# Patient Record
(demographics unavailable — no encounter records)

---

## 2025-03-03 NOTE — PAST MEDICAL HISTORY
[Menarche Age ____] : age at menarche was [unfilled] [Menopause Age____] : age at menopause was [unfilled] [Total Preg ___] : G[unfilled] [Live Births ___] : P[unfilled]  [Age At Live Birth ___] : Age at live birth: [unfilled] [FreeTextEntry8] : 4 yr

## 2025-03-03 NOTE — ASSESSMENT
[FreeTextEntry1] : Ms Cochran is a 71 year old woman at high risk for breast cancer. Her breast exam today is without suspicious findings. A breast MRI is ordered for April. I would like to see her back for a follow-up in 1 year. She understands and is comfortable with the plan. She is encouraged to call if any questions or concerns arise.

## 2025-03-03 NOTE — DATA REVIEWED
[FreeTextEntry1] : I have independently reviewed the reports and the images.   Breast MRI 4/29/24 - BIRADS 2  B/l mammogram and complete US 10/7/24 - heterogenously dense  - BIRADS 2

## 2025-03-03 NOTE — PHYSICAL EXAM
[Normocephalic] : normocephalic [Atraumatic] : atraumatic [Sclera nonicteric] : sclera nonicteric [Supple] : supple [No Supraclavicular Adenopathy] : no supraclavicular adenopathy [No Cervical Adenopathy] : no cervical adenopathy [Clear to Auscultation Bilat] : clear to auscultation bilaterally [Normal Sinus Rhythm] : normal sinus rhythm [Examined in the supine and seated position] : examined in the supine and seated position [Bra Size: ___] : Bra Size: [unfilled] [No dominant masses] : no dominant masses in right breast  [No dominant masses] : no dominant masses left breast [No Nipple Retraction] : no left nipple retraction [No Nipple Discharge] : no left nipple discharge [No Axillary Lymphadenopathy] : no left axillary lymphadenopathy [No Edema] : no edema [No Rashes] : no rashes [No Ulceration] : no ulceration [de-identified] : Inferior transverse scar

## 2025-03-03 NOTE — HISTORY OF PRESENT ILLNESS
[FreeTextEntry1] : Ms. Cochran is a 71 year old woman who presents for a follow up for a high risk for breast cancer. Her breast history is notable for a benign left breast excision and needle biopsy. She has no breast symptoms. 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  Her genetic panel testing is negative. Her family history is significant for breast cancer in her mother at 75, sister at 50 who is BRCA negative, maternal aunt at 75, and paternal grandmother at 45.

## 2025-03-03 NOTE — CONSULT LETTER
[Dear  ___] : Dear ~SALVADOR, [Courtesy Letter:] : I had the pleasure of seeing your patient, [unfilled], in my office today. [Please see my note below.] : Please see my note below. [Consult Closing:] : Thank you very much for allowing me to participate in the care of this patient.  If you have any questions, please do not hesitate to contact me. [Sincerely,] : Sincerely, [FreeTextEntry3] : Kena Gambel MD FACS

## 2025-04-30 NOTE — HISTORY OF PRESENT ILLNESS
[Sudden] : sudden [3] : 3 [1] : 2 [Dull/Aching] : dull/aching [Constant] : constant [Household chores] : household chores [Leisure] : leisure [Social interactions] : social interactions [Rest] : rest [Meds] : meds [Walking] : walking [Retired] : Work status: retired [de-identified] : New patient here today complaining of bilateral ankle pain, L>R. No specific injury, pain has been present for 6 weeks. Hx of spraining her left ankle 3 years ago. Has had PT for her left ankle back in 2022.  No recent treatment. States she is having difficulty with walking, reports her left ankle gives out. Taking Advil PRN. Experiencing aching pain medial of her right ankle. 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

## 2025-04-30 NOTE — ASSESSMENT
[FreeTextEntry1] : 71yoF with left ankle instability, bilateral posterior tibial tendinitis left with mild planovalgus deformity and right without deformity. Patient with + anterior drawer of left ankle and notes instability that is iterfering with her ADLs/QOL. -Rx MRI left ankle w/o contrast to r/o tendinous/ligamentous injury and plan for possible surgical intervention -Activities as tolerated -Rest, ice, compression, elevation, NSAIDs PRN for pain. -All questions answered -F/u after MRI   The diagnosis was explained in detail. The potential non-surgical and surgical treatments were reviewed. The relative risks and benefits of each option were considered relative to the patients age, activity level, medical history, symptom severity and previously attempted treatments.   The patient was advised to consult with their primary medical provider prior to initiation of any new medications to reduce the risk of adverse effects specific to their long-term home medications and medical history.   The patient's current medications management of their orthopedic diagnosis was reviewed today:   1) We discussed a comprehensive treatment plan that included possible pharmaceutical management involving the use of prescription strength medications including but not limited to options as oral Naprosyn 500mg BID, once daily Meloxicam 15 mg, or 500-650 mg Tylenol versus over the counter oral medications and topical prescriptions NSAID Pennsaid vs over the counter Voltaren gel.  2) There is a moderate risk of morbidity with further treatment, especially from use of prescription strength medications and possible side effects of these medications which include upset stomach with oral medications, skin reactions to topical medications and cardiac/renal issues with long term use.  3) I recommended that hte patient follow-up with their medical physician to discuss any significant specific potential issues with long term medication use such as interactions with current medications or with exacerbation of underlying medical comorbidities.  4) The benefits and risks associated with use of injectable, oral or topical, prescription and over the counter anti-inflammatory medications were discussed with the patient. The patient voiced understanding of the risks including but not limited to bleeding, stroke, kidney dysfunction, heart disease, and were referred to the black box warning label for further information  Entered by Gurdeep Andrew PA-C acting as scribe. Dr. Almaraz Attestation The documentation recorded by the scribe, in my presence, accurately reflects the service I, Dr. Almaraz, personally performed, and the decisions made by me with my edits as appropriate.

## 2025-04-30 NOTE — PHYSICAL EXAM
[de-identified] : Examination of the left foot and ankle is as follows: INSPECTION: mild medial ankle swelling, mild planovalgus deformity, no ecchymosis, no abrasion, laceration, no erythema PALPATION: lateral ligament tenderness, posterior tibial tendon tenderness ROM: plantarflexion 40 degrees, inversion 30 degrees, eversion 20 degrees, dorsiflexion 20 degrees STRENGTH: dorsiflexion 5/5, plantar flexion 5/5, inversion 5/5, eversion 5/5, EHL 5/5, FHL 5/5 TESTING: + anterior drawer VASCULAR: dorsalis pedis pulse: 2+, posterior tibialis pulse: 2+ NEURO: Sensation present to light touch in all distributions GAIT: mildly antalgic, ambulation without assisted devices   X-rays of the left ankle is as follows: Ankle 3 view AP/Lateral/Oblique: No fractures, subluxations or dislocations. No major abnormalities.  Examination of the right foot and ankle is as follows: INSPECTION: no swelling, no ecchymosis, no abrasion, laceration, no erythema, no deformity PALPATION: mild posterior tibial tendon tenderness, no lateral ligament tenderness ROM: plantarflexion 40 degrees, inversion 30 degrees, eversion 20 degrees, dorsiflexion 20 degrees STRENGTH: dorsiflexion 5/5, plantar flexion 5/5, inversion 5/5, eversion 5/5, EHL 5/5, FHL 5/5 VASCULAR: dorsalis pedis pulse: 2+, posterior tibialis pulse: 2+ NEURO: Sensation present to light touch in all distributions GAIT: mildly antalgic, ambulation without assisted devices   X-rays of the right ankle is as follows: Ankle 3 view AP/Lateral/Oblique: No fractures, subluxations or dislocations. No major abnormalities.

## 2025-05-14 NOTE — PHYSICAL EXAM
[de-identified] : Examination of the left foot and ankle is as follows: INSPECTION: mild medial ankle swelling, mild planovalgus deformity, no ecchymosis, no abrasion, laceration, no erythema PALPATION: lateral ligament tenderness, posterior tibial tendon tenderness ROM: plantarflexion 40 degrees, inversion 30 degrees, eversion 20 degrees, dorsiflexion 20 degrees STRENGTH: dorsiflexion 5/5, plantar flexion 5/5, inversion 5/5, eversion 5/5, EHL 5/5, FHL 5/5 TESTING: + anterior drawer VASCULAR: dorsalis pedis pulse: 2+, posterior tibialis pulse: 2+ NEURO: Sensation present to light touch in all distributions GAIT: mildly antalgic, ambulation without assisted devices   X-rays of the left ankle is as follows: Ankle 3 view AP/Lateral/Oblique: No fractures, subluxations or dislocations. No major abnormalities.  Examination of the right foot and ankle is as follows: INSPECTION: no swelling, no ecchymosis, no abrasion, laceration, no erythema, no deformity PALPATION: mild posterior tibial tendon tenderness, no lateral ligament tenderness ROM: plantarflexion 40 degrees, inversion 30 degrees, eversion 20 degrees, dorsiflexion 20 degrees STRENGTH: dorsiflexion 5/5, plantar flexion 5/5, inversion 5/5, eversion 5/5, EHL 5/5, FHL 5/5 VASCULAR: dorsalis pedis pulse: 2+, posterior tibialis pulse: 2+ NEURO: Sensation present to light touch in all distributions GAIT: mildly antalgic, ambulation without assisted devices   X-rays of the right ankle is as follows: Ankle 3 view AP/Lateral/Oblique: No fractures, subluxations or dislocations. No major abnormalities.

## 2025-05-14 NOTE — HISTORY OF PRESENT ILLNESS
[Sudden] : sudden [3] : 3 [1] : 2 [Dull/Aching] : dull/aching [Constant] : constant [Household chores] : household chores [Leisure] : leisure [Social interactions] : social interactions [Rest] : rest [Meds] : meds [Walking] : walking [Retired] : Work status: retired [de-identified] : New patient here for left ankle MRI viewing. Patient had MRI at [facility] on 5/1/25.  IMPRESSION   Mild tenosynovitis of the posterior tibial and Peroneal tendons without tendinopathy or tear Mild distal Achilles tendinosis without tear Mild to moderate diffuse partial thickness chondral loss in the tibiotalar joint with areas of deep chondral fissuring and moderate reactive subchondral marrow edema in the distal tibial plafond No evidence of ligament tear Small ankle and talonavicular joint effusions with mild posteromedial soft tissue edema Mild plantar fascia thickening without tear   [] : Post Surgical Visit: no [FreeTextEntry1] : bilateral ankles L>R [FreeTextEntry3] : 6 weeks ago  [FreeTextEntry5] : NKI  [FreeTextEntry6] : weakness  [de-identified] : 2022 [de-identified] : Kasandra  [de-identified] : 2022 for left ankle  [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

## 2025-05-14 NOTE — ASSESSMENT
[FreeTextEntry1] : 71yoF presenting today for MRI viewing of her left ankle revealing PTTD, peroneal tendintis, achilles tendinosis, mild to moderate tibiotalar, ST, TN, CC joint djd, stress reaction within tibial plafond. Recommend non-surgical management. Patient with + anterior drawer of left ankle and notes instability that is interfering with her ADLs/QOL. -Rx PT given today -Discussed with patient that if she continues to have persistent pain that she will be indicated for left ankle lateral ligament reconstruction, possible tibial plafond subchondroplasty -Activities as tolerated -Rest, ice, compression, elevation, NSAIDs PRN for pain. -All questions answered -F/u 6 weeks   The diagnosis was explained in detail. The potential non-surgical and surgical treatments were reviewed. The relative risks and benefits of each option were considered relative to the patients age, activity level, medical history, symptom severity and previously attempted treatments.   The patient was advised to consult with their primary medical provider prior to initiation of any new medications to reduce the risk of adverse effects specific to their long-term home medications and medical history.   The patient's current medications management of their orthopedic diagnosis was reviewed today:   1) We discussed a comprehensive treatment plan that included possible pharmaceutical management involving the use of prescription strength medications including but not limited to options as oral Naprosyn 500mg BID, once daily Meloxicam 15 mg, or 500-650 mg Tylenol versus over the counter oral medications and topical prescriptions NSAID Pennsaid vs over the counter Voltaren gel.  2) There is a moderate risk of morbidity with further treatment, especially from use of prescription strength medications and possible side effects of these medications which include upset stomach with oral medications, skin reactions to topical medications and cardiac/renal issues with long term use.  3) I recommended that hte patient follow-up with their medical physician to discuss any significant specific potential issues with long term medication use such as interactions with current medications or with exacerbation of underlying medical comorbidities.  4) The benefits and risks associated with use of injectable, oral or topical, prescription and over the counter anti-inflammatory medications were discussed with the patient. The patient voiced understanding of the risks including but not limited to bleeding, stroke, kidney dysfunction, heart disease, and were referred to the black box warning label for further information  Entered by Gurdeep Andrew PA-C acting as scribe. Dr. Almaraz Attestation The documentation recorded by the scribe, in my presence, accurately reflects the service I, Dr. Almaraz, personally performed, and the decisions made by me with my edits as appropriate.

## 2025-05-14 NOTE — DATA REVIEWED
[MRI] : MRI [Left] : left [Ankle] : ankle [I independently reviewed and interpreted images and report] : I independently reviewed and interpreted images and report [FreeTextEntry1] : [facility]; IMPRESSION  Mild tenosynovitis of the posterior tibial and Peroneal tendons without tendinopathy or tear Mild distal Achilles tendinosis without tear Mild to moderate diffuse partial thickness chondral loss in the tibiotalar joint with areas of deep chondral fissuring and moderate reactive subchondral marrow edema in the distal tibial plafond No evidence of ligament tear Small ankle and talonavicular joint effusions with mild posteromedial soft tissue edema Mild plantar fascia thickening without tear

## 2025-05-27 NOTE — HISTORY OF PRESENT ILLNESS
[FreeTextEntry1] : Ana Maria Cochran returns after a long absence stating that her left little toe is really bothering her.  She notices it rubbing in her sneakers.  She states that it has been hurting for about a month and finally got here.  She denies seeing any bleeding, pus, or discharge.

## 2025-05-27 NOTE — PHYSICAL EXAM
[Ankle Swelling (On Exam)] : not present [Varicose Veins Of Lower Extremities] : not present [] : not present [Delayed in the Right Toes] : capillary refills normal in right toes [Delayed in the Left Toes] : capillary refills normal in the left toes [1+] : left foot dorsalis pedis 1+ [TextEntry] : The distal lateral aspect of the left fifth toe has an area of hyperkeratosis present.  The area is painful to palpation.  No pus, discharge, or signs of infection is noted.

## 2025-05-27 NOTE — ASSESSMENT
[FreeTextEntry1] : Assessment: Hammertoe deformity, left fifth toe.  Plan: Utilizing a sterile #15 blade, I sharply debrided the painful hyperkeratotic lesion to the patient's tolerance.  I applied dispensed dispersion padding.  I advised the patient to notify the office right away if increased redness, swelling, pain, open wounds or discharge were observed.  PTR:  6-8 weeks or as needed.

## 2025-06-18 NOTE — ASSESSMENT
[FreeTextEntry1] : 71yoF presenting today for f/u of left ankle PTTD, peroneal tendinitis, Achilles tendinosis, mild to moderate tibiotalar, ST, TN, CC joint djd, stress reaction within tibial plafond and right PTTD. Patient went to PT with noted improvement in her left ankle pain. Patient noting worsening right ankle pain secondary to PTTD. -Advised continued PT for bilateral ankles -RLE WBAT in lace up ankle support brace, rx given  -Rx Medrol dose angelia given today, advised patient to take as according to pill angelia instructions -As patient's pain has improved for left ankle, do not recommend surgical intervention at this time but may be indicated for left ankle lateral ligament reconstruction, possible tibial plafond subchondroplasty -Activities as tolerated -Rest, ice, compression, elevation, NSAIDs PRN for pain. -All questions answered -F/u PRN   The diagnosis was explained in detail. The potential non-surgical and surgical treatments were reviewed. The relative risks and benefits of each option were considered relative to the patients age, activity level, medical history, symptom severity and previously attempted treatments.   The patient was advised to consult with their primary medical provider prior to initiation of any new medications to reduce the risk of adverse effects specific to their long-term home medications and medical history.   The patient's current medications management of their orthopedic diagnosis was reviewed today:   1) We discussed a comprehensive treatment plan that included possible pharmaceutical management involving the use of prescription strength medications including but not limited to options as oral Naprosyn 500mg BID, once daily Meloxicam 15 mg, or 500-650 mg Tylenol versus over the counter oral medications and topical prescriptions NSAID Pennsaid vs over the counter Voltaren gel.  2) There is a moderate risk of morbidity with further treatment, especially from use of prescription strength medications and possible side effects of these medications which include upset stomach with oral medications, skin reactions to topical medications and cardiac/renal issues with long term use.  3) I recommended that hte patient follow-up with their medical physician to discuss any significant specific potential issues with long term medication use such as interactions with current medications or with exacerbation of underlying medical comorbidities.  4) The benefits and risks associated with use of injectable, oral or topical, prescription and over the counter anti-inflammatory medications were discussed with the patient. The patient voiced understanding of the risks including but not limited to bleeding, stroke, kidney dysfunction, heart disease, and were referred to the black box warning label for further information  Entered by Gurdeep Andrew PA-C acting as scribe. Dr. Almaraz Attestation The documentation recorded by the scribe, in my presence, accurately reflects the service I, Dr. Almaraz, personally performed, and the decisions made by me with my edits as appropriate.

## 2025-06-18 NOTE — HISTORY OF PRESENT ILLNESS
[Sudden] : sudden [6] : 6 [4] : 4 [Dull/Aching] : dull/aching [Sharp] : sharp [Throbbing] : throbbing [Constant] : constant [Household chores] : household chores [Leisure] : leisure [Social interactions] : social interactions [Rest] : rest [Meds] : meds [Walking] : walking [Part time] : Work status: part time [de-identified] : New patient here for left ankle. Patient being treated for Posterior tibial tendinitis of left lower extremity, Peroneal tendinitis of left lower extremity, Primary osteoarthritis, Achilles tendinosis and Posterior tibialis tendinitis of both lower extremities. Patient states she is going to PT 1x a week. Patient states left ankle is doing much better. Patient states right ankle swells every day and the entire ankle has aching and occasional pain with weight bearing.  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

## 2025-07-30 NOTE — PHYSICAL EXAM
[de-identified] : General: Alert and oriented x3. In no acute distress. Pleasant in nature with a normal affect. No apparent respiratory distress.  R Ankle Exam  post tib tenderness medial malleolus pain  Skin: Clean, dry, intact Inspection: No obvious malalignment, no swelling, no effusion; no lymphadenopathy Pulses: 2+ DP/PT pulses ROM: R Ankle 15 degrees of dorsiflexion, 40 degrees of plantarflexion, 10 degrees of subtalar motion, 20 degrees of inversion, 20 degrees of eversion Tenderness: No tenderness over the lateral malleolus, no CFL/ATFL/PTFL pain. No medial malleolus pain, no deltoid ligament pain. No proximal fibular pain. No heel pain. Stability: Negative anterior/posterior drawer. Strength: 5/5 TA/GS/EHL Neuro: In tact to light touch throughout Additional tests: Negative Mortons test, Negative syndesmosis squeeze test. Negative Belcher's. Negative Tinel's Sign  [de-identified] : 3V of the right ankle were ordered obtained and reviewed by me today, 07/30/2025 , revealed: no fractures noted

## 2025-07-30 NOTE — DISCUSSION/SUMMARY
[de-identified] :  Today I had a lengthy discussion with the patient regarding their right ankle pain. I have addressed all the patient's concerns surrounding the pathology of their condition.  I have reviewed the patient's XR imaging with them in great detail. I recommend that the patient utilize ice, NSAIDS PRN, and heat. They can also elevate their RLE above the level of the heart.  At this time I would like to obtain advanced imaging of the patient's right ankle. An MRI was ordered so I can find out more about the etiology of the patient's condition. The patient should follow up with the office after obtaining the MRI. Patient can stop physical therapy if she is feeling pain during the sessions.   The patient understood and verbally agreed to the treatment plan. All of their questions were answered and they were satisfied with the visit. The patient should call the office if they have any questions or experience worsening symptoms.  FU in 6-8 weeks

## 2025-07-30 NOTE — ADDENDUM
[FreeTextEntry1] : I, Lan Orr, acted solely as a scribe for Dr. Lan Slaughter on this date 07/30/2025  .   All medical record entries made by the Scribe were at my, Dr. Lan Slaughter, direction and personally dictated by me on 07/30/2025 . I have reviewed the chart and agree that the record accurately reflects my personal performance of the history, physical exam, assessment and plan. I have also personally directed, reviewed, and agreed with the chart.

## 2025-07-30 NOTE — HISTORY OF PRESENT ILLNESS
[FreeTextEntry1] :  07/30/2025: REINA LEON is a 71 year old female presenting to the office for an evaluation of her left ankle. She has osteoarthritis on her left ankle that had been causing her pain but her symptoms resolved. However, once her left ankle stopped bothering her, that is when her right ankle started to cause her pain. She has been seeing Dr. Almaraz previously who prescribed her physical therapy.  The patient presents to the office in [facility] and ambulating without assistance.